# Patient Record
Sex: MALE | Race: WHITE | HISPANIC OR LATINO | URBAN - METROPOLITAN AREA
[De-identification: names, ages, dates, MRNs, and addresses within clinical notes are randomized per-mention and may not be internally consistent; named-entity substitution may affect disease eponyms.]

---

## 2024-11-13 ENCOUNTER — OFFICE VISIT (OUTPATIENT)
Dept: URGENT CARE | Age: 29
End: 2024-11-13
Payer: COMMERCIAL

## 2024-11-13 VITALS
TEMPERATURE: 98.1 F | SYSTOLIC BLOOD PRESSURE: 160 MMHG | DIASTOLIC BLOOD PRESSURE: 80 MMHG | HEART RATE: 80 BPM | OXYGEN SATURATION: 98 % | RESPIRATION RATE: 18 BRPM

## 2024-11-13 DIAGNOSIS — B35.3 ATHLETE'S FOOT ON LEFT: Primary | ICD-10-CM

## 2024-11-13 DIAGNOSIS — R03.0 ELEVATED BLOOD PRESSURE READING WITHOUT DIAGNOSIS OF HYPERTENSION: ICD-10-CM

## 2024-11-13 PROCEDURE — 99203 OFFICE O/P NEW LOW 30 MIN: CPT | Performed by: PHYSICIAN ASSISTANT

## 2024-11-13 RX ORDER — FLUCONAZOLE 150 MG/1
TABLET ORAL
Qty: 5 TABLET | Refills: 0 | Status: SHIPPED | OUTPATIENT
Start: 2024-11-13

## 2024-11-13 RX ORDER — DOXYLAMINE SUCCINATE 25 MG
TABLET ORAL 2 TIMES DAILY
Qty: 14 G | Refills: 0 | Status: SHIPPED | OUTPATIENT
Start: 2024-11-13

## 2024-11-13 NOTE — PROGRESS NOTES
Subjective   Patient ID: Stevan Khan is a 29 y.o. male. They present today with a chief complaint of athlete foot (2 months).    History of Present Illness  HPI    Patient presents to the urgent care is a 29-year-old male with a 2-month history of athlete's foot.  Patient reports the rash is burning, painful, between his first and second toe.  Patient states he has tried multiple topical medications that seem to improve initially, then symptoms worsened again.  Patient denies any prior history of hypertension, kidney or liver dysfunction.    Past Medical History  Allergies as of 11/13/2024    (No Known Allergies)       (Not in a hospital admission)       History reviewed. No pertinent past medical history.    History reviewed. No pertinent surgical history.         Review of Systems  Review of Systems  Patient denies changes in medication, foods, laundry detergents, lotions, soap, new foods, numbness, tingling, discharge, sore throat, fever, dizziness, shortness of breath, increased urinary frequency, chills, peripheral edema, abdominal pain, chest pain and myalgias.                             Objective    Vitals:    11/13/24 1535   BP: 160/80   Pulse: 80   Resp: 18   Temp: 36.7 °C (98.1 °F)   SpO2: 98%     No LMP for male patient.    Physical Exam  Appearance: Alert, oriented, cooperative, in no acute distress. Well nourished & well hydrated.    Skin: no petechiae or purpura.  Erythematous tender plaque between left first and second digit, extension onto dorsum of great toe, webbing between macerated, wet, no streaking    Eyes: Conjunctiva pink with no redness or exudates. Eyelids without lesions. No scleral icterus.     ENT: Hearing grossly intact. External inspection of ears without lesions or erythema. no nasal flaring. no tripoding, no drooling, no difficulty swallowing oral secretions.    Pulmonary:  Good chest wall excursion. No accessory muscle use or stridor.    Cardiac:  No JVD.     Musculoskeletal: no  deformity. No cyanosis, clubbing. sensation 5/5 distal to rash, cap. fill < 2 sec distal to rash    Neurological: no focal findings identified.    Psychiatric: Appropriate mood and affect.    Procedures    Point of Care Test & Imaging Results from this visit  No results found for this visit on 11/13/24.   No results found.    Diagnostic study results (if any) were reviewed by Gloria Beyer PA-C.    Assessment/Plan   Allergies, medications, history, and pertinent labs/EKGs/Imaging reviewed by Gloria Beyer PA-C.     Medical Decision Making  Physical exam was consistent with athlete's foot, patient has already tried using over-the-counter topical cream with some improvement.  Patient will be prescribed fluconazole to begin once a week x 5 weeks.  Patient was advised to continue using miconazole topical as well.  Patient's blood pressure was elevated, patient denied any prior history of hypertension.  Patient has not seen a primary care in many years, and was given referral for PCP. If symptoms are not improving or if they are worsening the patient will call their primary care physician and go to the ER. Patient verbalizes understanding and agrees with plan of care. All questions were answered.    Dictation software was used in the creation of this note which does not evaluate or correct for typographical, spelling, syntax or grammatical errors.    Orders and Diagnoses  Diagnoses and all orders for this visit:  Athlete's foot on left  -     fluconazole (Diflucan) 150 mg tablet; Take 1 tab PO once a week x 5 weeks  -     miconazole (Micatin) 2 % cream; Apply topically 2 times a day.  -     Referral to Primary Care; Future  Elevated blood pressure reading without diagnosis of hypertension      Medical Admin Record      Patient disposition: Home    Electronically signed by Gloria Beyer PA-C  7:02 PM

## 2024-11-13 NOTE — PATIENT INSTRUCTIONS
"Home Care:  1) Use medication as directed.  2) Follow up with your primary care if symptoms worsen or do not improve.    What are ringworm, athlete's foot, and jock itch? - They are skin infections caused by a fungus. These types of fungal infections are also called \"tinea.\"  Some people call these fungal infections \"ringworm,\" because they often cause a ring-shaped, red, itchy rash on the skin. But a ring-shaped rash is not always there. People with athlete's foot might instead have moist, raw skin between their toes, or flaking skin on the bottoms of their feet. People with jock itch often just have a red rash on the groin.  Sometimes, especially in children, the fungus can infect the scalp. On the scalp, the infection can look like a bald spot or a round flaky patch of skin.    How did I get a fungal infection?   - You can catch fungal infections from anyone who is infected. You can also catch them from an infected dog or cat. Plus, you can  the infections from places where the fungus might be, such as:  -A shower stall  -The locker room floor  -The area near a pool  If you have a fungal infection on one part of your body, you can also spread it to other parts. For instance, men with a fungal infection on their feet sometimes spread it to their groin.    How do I keep from getting a fungal infection again?   - If someone in your home has had a fungal infection on their scalp:  -Get rid of any andino, brushes, barrettes, or other hair products that could have the fungus on them  -If the fungal infection might have come from a pet, have it checked by a vet    Here are some other general tips on how to prevent fungal infections:  -Do not share unwashed clothes, sports gear, or towels with other people  -Always wear slippers or sandals when at the gym, pool, or other public areas. That includes public showers.  -Wash with soap and shampoo after sports or exercise  -Change your socks and underwear at least once " a day  -Keep your skin clean and dry. Always dry yourself well after swimming or showering.